# Patient Record
Sex: MALE | Race: WHITE | NOT HISPANIC OR LATINO
[De-identification: names, ages, dates, MRNs, and addresses within clinical notes are randomized per-mention and may not be internally consistent; named-entity substitution may affect disease eponyms.]

---

## 2017-12-12 ENCOUNTER — APPOINTMENT (OUTPATIENT)
Dept: ORTHOPEDIC SURGERY | Facility: CLINIC | Age: 52
End: 2017-12-12
Payer: COMMERCIAL

## 2017-12-12 VITALS
HEIGHT: 68 IN | BODY MASS INDEX: 25.76 KG/M2 | DIASTOLIC BLOOD PRESSURE: 70 MMHG | HEART RATE: 69 BPM | SYSTOLIC BLOOD PRESSURE: 109 MMHG | WEIGHT: 170 LBS

## 2017-12-12 PROCEDURE — 73030 X-RAY EXAM OF SHOULDER: CPT | Mod: LT

## 2017-12-12 PROCEDURE — 99214 OFFICE O/P EST MOD 30 MIN: CPT

## 2018-02-22 ENCOUNTER — APPOINTMENT (OUTPATIENT)
Dept: ORTHOPEDIC SURGERY | Facility: CLINIC | Age: 53
End: 2018-02-22
Payer: COMMERCIAL

## 2018-02-22 VITALS
BODY MASS INDEX: 25.76 KG/M2 | SYSTOLIC BLOOD PRESSURE: 118 MMHG | HEIGHT: 68 IN | WEIGHT: 170 LBS | HEART RATE: 78 BPM | DIASTOLIC BLOOD PRESSURE: 76 MMHG

## 2018-02-22 DIAGNOSIS — M25.562 PAIN IN LEFT KNEE: ICD-10-CM

## 2018-02-22 PROCEDURE — 99214 OFFICE O/P EST MOD 30 MIN: CPT

## 2018-02-22 PROCEDURE — 73564 X-RAY EXAM KNEE 4 OR MORE: CPT | Mod: LT

## 2018-02-26 ENCOUNTER — FORM ENCOUNTER (OUTPATIENT)
Age: 53
End: 2018-02-26

## 2018-02-27 ENCOUNTER — OUTPATIENT (OUTPATIENT)
Dept: OUTPATIENT SERVICES | Facility: HOSPITAL | Age: 53
LOS: 1 days | End: 2018-02-27
Payer: COMMERCIAL

## 2018-02-27 ENCOUNTER — APPOINTMENT (OUTPATIENT)
Dept: MRI IMAGING | Facility: CLINIC | Age: 53
End: 2018-02-27

## 2018-02-27 DIAGNOSIS — Z98.89 OTHER SPECIFIED POSTPROCEDURAL STATES: Chronic | ICD-10-CM

## 2018-02-27 DIAGNOSIS — M25.562 PAIN IN LEFT KNEE: ICD-10-CM

## 2018-02-27 DIAGNOSIS — M75.82 OTHER SHOULDER LESIONS, LEFT SHOULDER: ICD-10-CM

## 2018-02-27 PROCEDURE — 73221 MRI JOINT UPR EXTREM W/O DYE: CPT

## 2018-02-27 PROCEDURE — 73721 MRI JNT OF LWR EXTRE W/O DYE: CPT

## 2018-02-27 PROCEDURE — 73221 MRI JOINT UPR EXTREM W/O DYE: CPT | Mod: 26,LT

## 2018-02-27 PROCEDURE — 73721 MRI JNT OF LWR EXTRE W/O DYE: CPT | Mod: 26,LT

## 2018-03-26 ENCOUNTER — APPOINTMENT (OUTPATIENT)
Dept: ORTHOPEDIC SURGERY | Facility: CLINIC | Age: 53
End: 2018-03-26
Payer: COMMERCIAL

## 2018-03-26 VITALS
DIASTOLIC BLOOD PRESSURE: 75 MMHG | BODY MASS INDEX: 25.76 KG/M2 | WEIGHT: 170 LBS | HEIGHT: 68 IN | HEART RATE: 71 BPM | SYSTOLIC BLOOD PRESSURE: 124 MMHG

## 2018-03-26 DIAGNOSIS — M75.82 OTHER SHOULDER LESIONS, LEFT SHOULDER: ICD-10-CM

## 2018-03-26 DIAGNOSIS — S83.242D OTHER TEAR OF MEDIAL MENISCUS, CURRENT INJURY, LEFT KNEE, SUBSEQUENT ENCOUNTER: ICD-10-CM

## 2018-03-26 PROCEDURE — 99213 OFFICE O/P EST LOW 20 MIN: CPT

## 2019-08-30 ENCOUNTER — EMERGENCY (EMERGENCY)
Facility: HOSPITAL | Age: 54
LOS: 1 days | Discharge: HOME | End: 2019-08-30
Attending: EMERGENCY MEDICINE
Payer: COMMERCIAL

## 2019-08-30 VITALS
TEMPERATURE: 98 F | RESPIRATION RATE: 17 BRPM | DIASTOLIC BLOOD PRESSURE: 85 MMHG | SYSTOLIC BLOOD PRESSURE: 151 MMHG | HEART RATE: 64 BPM | OXYGEN SATURATION: 99 %

## 2019-08-30 VITALS
SYSTOLIC BLOOD PRESSURE: 150 MMHG | HEART RATE: 71 BPM | OXYGEN SATURATION: 99 % | DIASTOLIC BLOOD PRESSURE: 90 MMHG | WEIGHT: 164.91 LBS | RESPIRATION RATE: 18 BRPM | TEMPERATURE: 98 F | HEIGHT: 68 IN

## 2019-08-30 DIAGNOSIS — Z98.89 OTHER SPECIFIED POSTPROCEDURAL STATES: Chronic | ICD-10-CM

## 2019-08-30 PROCEDURE — 96375 TX/PRO/DX INJ NEW DRUG ADDON: CPT

## 2019-08-30 PROCEDURE — 99284 EMERGENCY DEPT VISIT MOD MDM: CPT | Mod: 25

## 2019-08-30 PROCEDURE — 99284 EMERGENCY DEPT VISIT MOD MDM: CPT

## 2019-08-30 PROCEDURE — 96374 THER/PROPH/DIAG INJ IV PUSH: CPT

## 2019-08-30 RX ORDER — SODIUM CHLORIDE 9 MG/ML
1000 INJECTION, SOLUTION INTRAVENOUS ONCE
Refills: 0 | Status: COMPLETED | OUTPATIENT
Start: 2019-08-30 | End: 2019-08-30

## 2019-08-30 RX ORDER — KETOROLAC TROMETHAMINE 30 MG/ML
15 SYRINGE (ML) INJECTION ONCE
Refills: 0 | Status: DISCONTINUED | OUTPATIENT
Start: 2019-08-30 | End: 2019-08-30

## 2019-08-30 RX ORDER — ACETAMINOPHEN 500 MG
650 TABLET ORAL ONCE
Refills: 0 | Status: COMPLETED | OUTPATIENT
Start: 2019-08-30 | End: 2019-08-30

## 2019-08-30 RX ORDER — METOCLOPRAMIDE HCL 10 MG
10 TABLET ORAL ONCE
Refills: 0 | Status: COMPLETED | OUTPATIENT
Start: 2019-08-30 | End: 2019-08-30

## 2019-08-30 RX ADMIN — Medication 650 MILLIGRAM(S): at 21:09

## 2019-08-30 RX ADMIN — Medication 15 MILLIGRAM(S): at 21:39

## 2019-08-30 RX ADMIN — Medication 10 MILLIGRAM(S): at 21:09

## 2019-08-30 RX ADMIN — SODIUM CHLORIDE 1000 MILLILITER(S): 9 INJECTION, SOLUTION INTRAVENOUS at 21:10

## 2019-08-30 RX ADMIN — Medication 15 MILLIGRAM(S): at 21:09

## 2019-08-30 RX ADMIN — Medication 650 MILLIGRAM(S): at 21:39

## 2019-08-30 NOTE — ED PROVIDER NOTE - OBJECTIVE STATEMENT
54m w hx anxiety here c/o headache x1 wk. Pt localizes pain to posterior head on left side w/o radiation. Pain started while doing work in yard. Gradual onset, constant since started. Has had headaches before but usually bifrontal. No neck pain/stiffness. No fever. No visual changes or vomiting. H/a not worse any one time of the day. Has tried tylenol and motrin w some relief.

## 2019-08-30 NOTE — ED PROVIDER NOTE - PROGRESS NOTE DETAILS
Elizabeth Goldberger PGY-3: pt feeling better after meds. Appears well. Will dc recommending outpt neuro f/u

## 2019-08-30 NOTE — ED ADULT NURSE NOTE - CAS DISCH CONDITION
Assumed care of patient from Karan Reyes PA-C at shift change 1800. CT results pending. LFT elevation. UTI present. 2009: CT negative. Referred to GI for LFTs. Treated UTI. Discussed treatment plan, return precautions, symptomatic relief, and expected time to improvement. All questions answered. Patient is stable for discharge and outpatient management.       Parish Dennis PA-C Stable

## 2019-08-30 NOTE — ED PROVIDER NOTE - CLINICAL SUMMARY MEDICAL DECISION MAKING FREE TEXT BOX
54m w hx anxiety here c/o 1 wk left-sided posterior headache. No red flags, nl neuro exam. Will treat sx and r/a

## 2019-08-30 NOTE — ED ADULT NURSE NOTE - OBJECTIVE STATEMENT
54 y.o M A&Ox3, with PMH of anxiety, presents to the ED c/o HA x1 week. Pt. reports pain to occipital portion of head - worse when palpated. Reports discomfort traveling up back of head associated with numbness sensation. Rates pain level 10/10 at this time. No relief over last week. Gross neuro intact. Pos. strength to all extremities. Denies vision changes, fevers, chills, SOB, CP, dizziness, n/v/d, ABD pain at this time. PERRL. Denies any recent falls or traumas. Safety and comfort provided.

## 2019-08-30 NOTE — ED PROVIDER NOTE - NSFOLLOWUPINSTRUCTIONS_ED_ALL_ED_FT
You were seen in the emergency department for headache. Please follow up with a neurologist in the next 3-5 days. Take motrin 400 milligrams every 4 hours as needed for continued headache. Return to the emergency department immediately if you experience fever, neck stiffness, changes in vision, or any other concerning symptoms.

## 2019-08-30 NOTE — ED PROVIDER NOTE - ATTENDING CONTRIBUTION TO CARE
55 yo male p/w HA x 1 week.  gradual onset, not maximal at onset, no LOC, no neck pain/stiffness, no fever.  Very well appearing, smiling, conversant.  Conservative treatment and reassess.

## 2019-08-30 NOTE — ED PROVIDER NOTE - PATIENT PORTAL LINK FT
You can access the FollowMyHealth Patient Portal offered by [facility] by registering at the following website: [email or web address]. By joining [facility]’s FollowMyHealth portal, you will also be able to view your health information using other applications (apps) compatible with our system.

## 2019-08-30 NOTE — ED PROVIDER NOTE - NSFOLLOWUPCLINICS_GEN_ALL_ED_FT
[facility]  Neurology  [address] [place] [address] [address] [place] [address]  Phone: [phone]  Fax:   Follow Up Time:

## 2019-08-30 NOTE — ED ADULT NURSE NOTE - NSIMPLEMENTINTERV_GEN_ALL_ED
Implemented All Universal Safety Interventions:  [place] to call system. Call bell, personal items and telephone within reach. Instruct patient to call for assistance. Room bathroom lighting operational. Non-slip footwear when patient is off stretcher. Physically safe environment: no spills, clutter or unnecessary equipment. Stretcher in lowest position, wheels locked, appropriate side rails in place.

## 2019-09-17 ENCOUNTER — APPOINTMENT (OUTPATIENT)
Dept: GASTROENTEROLOGY | Facility: CLINIC | Age: 54
End: 2019-09-17

## 2020-09-11 ENCOUNTER — NON-APPOINTMENT (OUTPATIENT)
Age: 55
End: 2020-09-11

## 2020-09-11 ENCOUNTER — APPOINTMENT (OUTPATIENT)
Dept: OPHTHALMOLOGY | Facility: CLINIC | Age: 55
End: 2020-09-11
Payer: COMMERCIAL

## 2020-09-11 PROCEDURE — 92014 COMPRE OPH EXAM EST PT 1/>: CPT

## 2022-04-22 ENCOUNTER — TRANSCRIPTION ENCOUNTER (OUTPATIENT)
Age: 57
End: 2022-04-22

## 2022-06-11 ENCOUNTER — NON-APPOINTMENT (OUTPATIENT)
Age: 57
End: 2022-06-11

## 2022-06-22 ENCOUNTER — NON-APPOINTMENT (OUTPATIENT)
Age: 57
End: 2022-06-22

## 2022-06-23 ENCOUNTER — EMERGENCY (EMERGENCY)
Facility: HOSPITAL | Age: 57
LOS: 1 days | Discharge: HOME | End: 2022-06-23
Attending: STUDENT IN AN ORGANIZED HEALTH CARE EDUCATION/TRAINING PROGRAM
Payer: COMMERCIAL

## 2022-06-23 VITALS
HEART RATE: 65 BPM | DIASTOLIC BLOOD PRESSURE: 90 MMHG | OXYGEN SATURATION: 99 % | RESPIRATION RATE: 18 BRPM | SYSTOLIC BLOOD PRESSURE: 150 MMHG

## 2022-06-23 VITALS
TEMPERATURE: 98 F | OXYGEN SATURATION: 99 % | RESPIRATION RATE: 20 BRPM | HEART RATE: 68 BPM | HEIGHT: 68 IN | SYSTOLIC BLOOD PRESSURE: 139 MMHG | WEIGHT: 175.05 LBS | DIASTOLIC BLOOD PRESSURE: 84 MMHG

## 2022-06-23 DIAGNOSIS — Z98.89 OTHER SPECIFIED POSTPROCEDURAL STATES: Chronic | ICD-10-CM

## 2022-06-23 PROCEDURE — 73562 X-RAY EXAM OF KNEE 3: CPT | Mod: 26,RT

## 2022-06-23 PROCEDURE — 73120 X-RAY EXAM OF HAND: CPT | Mod: 26,RT

## 2022-06-23 PROCEDURE — 73070 X-RAY EXAM OF ELBOW: CPT

## 2022-06-23 PROCEDURE — 99284 EMERGENCY DEPT VISIT MOD MDM: CPT

## 2022-06-23 PROCEDURE — 73562 X-RAY EXAM OF KNEE 3: CPT

## 2022-06-23 PROCEDURE — 99284 EMERGENCY DEPT VISIT MOD MDM: CPT | Mod: 25

## 2022-06-23 PROCEDURE — 73120 X-RAY EXAM OF HAND: CPT

## 2022-06-23 PROCEDURE — 73070 X-RAY EXAM OF ELBOW: CPT | Mod: 26,RT

## 2022-06-23 RX ORDER — AZTREONAM 2 G
1 VIAL (EA) INJECTION
Qty: 14 | Refills: 0
Start: 2022-06-23 | End: 2022-06-29

## 2022-06-23 RX ORDER — CEPHALEXIN 500 MG
500 CAPSULE ORAL ONCE
Refills: 0 | Status: COMPLETED | OUTPATIENT
Start: 2022-06-23 | End: 2022-06-23

## 2022-06-23 RX ORDER — CEPHALEXIN 500 MG
1 CAPSULE ORAL
Qty: 28 | Refills: 0
Start: 2022-06-23 | End: 2022-06-29

## 2022-06-23 RX ADMIN — Medication 500 MILLIGRAM(S): at 20:23

## 2022-06-23 RX ADMIN — Medication 1 TABLET(S): at 20:17

## 2022-06-23 NOTE — ED ADULT NURSE NOTE - OBJECTIVE STATEMENT
Pt had a mechanical trip and fall 11 days ago during which he sustained abrasions to his right elbow, dorsum of right hand and patellar region of right knee.  All areas have scabbed over and each is surrounded by mild erythema.  He denies fevers or chills.  No drainage present.

## 2022-06-23 NOTE — ED PROVIDER NOTE - PROGRESS NOTE DETAILS
discussion w/ pt regarding CDU for wound care and monitoring pt prefers to go home and will trial a course of oral antibiotics, pt to return to the ER for wound check in 48-72 hours to ensure wounds are responding to antibiotics

## 2022-06-23 NOTE — ED PROVIDER NOTE - NSFOLLOWUPINSTRUCTIONS_ED_ALL_ED_FT
1.  Stay well hydrated  2.  Take Keflex 500mgs every 6 hrs x 7 days and Bactrim DS 1 tab every 12 hrs x 7 days  3.  Follow up with your PMD in 2-3 days.   4.  Follow up with wound care clinic upon discharge.  Call to scheduled an appointment  5.  Return to the ER for worsening redness, fevers, worsening pain or any other concerning symptoms 1.  Stay well hydrated  2.  Take Keflex 500mgs every 6 hrs x 7 days and Bactrim DS 1 tab every 12 hrs x 7 days  3.  Follow up with your PMD in 2-3 days.   4.  Follow up with wound care clinic upon discharge.  Call to scheduled an appointment  5.  Return to the ER for worsening redness, fevers, worsening pain or any other concerning symptoms  6. please return to the ER in 48-72 hours for wound recheck and suture removal.

## 2022-06-23 NOTE — ED ADULT NURSE NOTE - NSIMPLEMENTINTERV_GEN_ALL_ED
Implemented All Fall Risk Interventions:  [place] to call system. Call bell, personal items and telephone within reach. Instruct patient to call for assistance. Room bathroom lighting operational. Non-slip footwear when patient is off stretcher. Physically safe environment: no spills, clutter or unnecessary equipment. Stretcher in lowest position, wheels locked, appropriate side rails in place. Provide visual cue, wrist band, yellow gown, etc. Monitor gait and stability. Monitor for mental status changes and reorient to person, place, and time. Review medications for side effects contributing to fall risk. Reinforce activity limits and safety measures with patient and family.

## 2022-06-23 NOTE — ED PROVIDER NOTE - OBJECTIVE STATEMENT
56 yo male with no PMHx presenting for wound check.  Patient reports that he tripped and fell while walking outside 11 days ago.  At that time patient suffered lacerations/abrasions to his right knee, right elbow and the dorsum of the right hand.  Patient was seen at urgent care at that time and had a few sutures placed in the right elbow.  Not started on abx.  Was instructed to return to urgent care in 10 days for suture removal and wound check.  Patient went to urgent care today and was subsequent sent to the ER due to concern for infection.  Patient denies fevers/chills, spreading erythema, worsening pain, purulent discharge, Cp, SOB, abd pain, NVD.

## 2022-06-23 NOTE — ED PROVIDER NOTE - NSFOLLOWUPCLINICS_GEN_ALL_ED_FT
[facility]  Podiatry/Wound Care  [address] [place] [address]  Phone: [phone]  Fax: [phone]    [facility]  Wound Care  [address] [place] [address]  Phone: [phone]  Fax: [phone]    [facility]  Wound Care  [address] [place] [address]  Phone: [phone]  Fax:

## 2022-06-23 NOTE — ED ADULT TRIAGE NOTE - CHIEF COMPLAINT QUOTE
Sent by  for right elbow, right hand, right knee wound infections s/p fall with laceration x 11 days ago

## 2022-06-23 NOTE — ED ADULT NURSE NOTE - NSICDXPASTMEDICALHX_GEN_ALL_CORE_FT
PAST MEDICAL HISTORY:  Anxiety     Tear of meniscus of left knee left knee    Tear of meniscus, lateral right knee

## 2022-06-23 NOTE — ED PROVIDER NOTE - ATTENDING APP SHARED VISIT CONTRIBUTION OF CARE
57 M here w/ wound check, pt was seen at  11 days ago had 3 stitches to the R elbow, and reports that he was told to come to the  for suture removal and wound check was seen in  and referred to the ER for eval. pt w/ no fevers, no chills, no nausea no vomiting, no purulent discharge. no worsening redness. On exam, pt is awake and alert in no distress, he has R dorsum along 5th metacarpal 1 cm scab w/ no purulent drainage, the wrist on the ulnar aspect 2cm scab w/ no purulent drainage, minimal surrounding erythema and the R elbow w/ a 3 cm scab w/ mild surrounding erythema and no purulen drainage but serous drainage, pt w/ a 4 cm scab on the patella on the R knee w/ minimal surrounding erythema and no purulent drainage. pt w/ intact flexion and extension of the R knee and the R wrist, fingers elbow. no streaking of erythema. Plan for antibiotics, keflex and bactrim, to return to the er in 3 days for wound check and suture removal.

## 2022-06-23 NOTE — ED PROVIDER NOTE - PHYSICAL EXAMINATION
Gen: AAO x 3, NAD  Skin: healing abrasion to the ulnar dorsal aspect of right hand.  large central scab with minimal surrounding erythema, no tenderness or discharge.  Healing abrasion overlying right elbow with central scab and mild surrounding erythema.  Small amount of purulent discharge expressed from wound.  FROM of the elbow.  Mild TTP.  Well healing scab over right knee mild surrounding erythema, no purulence or fluctuation.  FROM of the knee  HEENT: NC/AT, PERRLA, EOMI, MMM  Resp: unlabored CTAB  Cardiac: rrr s1s2, no murmurs, rubs or gallops  GI: ND, +BS, Soft, NT  Ext: no pedal edema, FROM in all extremities  Neuro: no focal deficits

## 2022-06-27 ENCOUNTER — EMERGENCY (EMERGENCY)
Facility: HOSPITAL | Age: 57
LOS: 1 days | Discharge: HOME | End: 2022-06-27
Attending: STUDENT IN AN ORGANIZED HEALTH CARE EDUCATION/TRAINING PROGRAM
Payer: COMMERCIAL

## 2022-06-27 VITALS
HEIGHT: 68 IN | DIASTOLIC BLOOD PRESSURE: 82 MMHG | HEART RATE: 69 BPM | OXYGEN SATURATION: 97 % | TEMPERATURE: 98 F | WEIGHT: 169.98 LBS | RESPIRATION RATE: 20 BRPM | SYSTOLIC BLOOD PRESSURE: 134 MMHG

## 2022-06-27 DIAGNOSIS — Z98.89 OTHER SPECIFIED POSTPROCEDURAL STATES: Chronic | ICD-10-CM

## 2022-06-27 PROCEDURE — 99281 EMR DPT VST MAYX REQ PHY/QHP: CPT

## 2022-06-27 NOTE — ED PROCEDURE NOTE - CPROC ED POST PROC CARE GUIDE1
vomiting, weak and dizzy since this am Verbal/written post procedure instructions were given to patient/caregiver.

## 2022-06-27 NOTE — ED PROVIDER NOTE - PATIENT PORTAL LINK FT
You can access the FollowMyHealth Patient Portal offered by [facility] by registering at the following website: [email or web address]. By joining [employer]’s FollowMyHealth portal, you will also be able to view your health information using other applications (apps) compatible with our system.

## 2022-06-27 NOTE — ED PROVIDER NOTE - PHYSICAL EXAMINATION
Gen: Well appearing and in NAD  Head: normal appearing atraumatic   Neck: trachea midline  Resp:  No respiratory distress  Abd; soft NT ND  Ext: no visible deformities, right elbow c/d/i with well healing scab on posterior surface no fluctuance, no surrounding erythema, no crepitus and no drainage 3 sutures removed with ease. right wrist abrasion and right knee abrasion c/d/i well healing FROM no fluctuance, no surrounding erythema, no crepitus and no drainage   Neuro:  Alert and oriented, appears non focal  Skin:  Warm and dry as visualized  Psych:  Normal affect and mood  ~John Herring D.O, -ED Attending

## 2022-06-27 NOTE — ED PROVIDER NOTE - NS ED ROS FT

## 2022-06-27 NOTE — ED PROVIDER NOTE - NSFOLLOWUPINSTRUCTIONS_ED_ALL_ED_FT
1. TAKE ALL MEDICATIONS AS DIRECTED.    2. FOR PAIN OR FEVER YOU CAN TAKE IBUPROFEN (MOTRIN, ADVIL) OR ACETAMINOPHEN (TYLENOL) AS NEEDED, AS DIRECTED ON PACKAGING.  3. FOLLOW UP WITH YOUR PRIMARY DOCTOR WITHIN 5 DAYS AS DIRECTED.  4. IF YOU HAD LABS OR IMAGING DONE, YOU WERE GIVEN COPIES OF ALL LABS AND/OR IMAGING RESULTS FROM YOUR ER VISIT--PLEASE TAKE THEM WITH YOU TO YOUR FOLLOW UP APPOINTMENTS.  5. RETURN TO THE ER FOR ANY WORSENING SYMPTOMS OR CONCERNS.      Wound Care, Adult      Taking care of your wound properly can help to prevent pain, infection, and scarring. It can also help your wound heal more quickly. Follow instructions from your health care provider about how to care for your wound.      Supplies needed:    •Soap and water.      •Wound cleanser.      •Gauze.      •If needed, a clean bandage (dressing) or other type of wound dressing material to cover or place in the wound. Follow your health care provider's instructions about what dressing supplies to use.      •Cream or ointment to apply to the wound, if told by your health care provider.        How to care for your wound    Cleaning the wound     Ask your health care provider how to clean the wound. This may include:  •Using mild soap and water or a wound cleanser.      •Using a clean gauze to pat the wound dry after cleaning it. Do not rub or scrub the wound.      Dressing care    •Wash your hands with soap and water for at least 20 seconds before and after you change the dressing. If soap and water are not available, use hand .    •Change your dressing as told by your health care provider. This may include:  •Cleaning or rinsing out (irrigating) the wound.      •Placing a dressing over the wound or in the wound (packing).      •Covering the wound with an outer dressing.        •Leave any stitches (sutures), skin glue, or adhesive strips in place. These skin closures may need to stay in place for 2 weeks or longer. If adhesive strip edges start to loosen and curl up, you may trim the loose edges. Do not remove adhesive strips completely unless your health care provider tells you to do that.      •Ask your health care provider when you can leave the wound uncovered.        Checking for infection    Check your wound area every day for signs of infection. Check for:  •More redness, swelling, or pain.      •Fluid or blood.      •Warmth.      •Pus or a bad smell.        Follow these instructions at home    Medicines     •If you were prescribed an antibiotic medicine, cream, or ointment, take or apply it as told by your health care provider. Do not stop using the antibiotic even if your condition improves.      •If you were prescribed pain medicine, take it 30 minutes before you do any wound care or as told by your health care provider.      •Take over-the-counter and prescription medicines only as told by your health care provider.      Eating and drinking   •Eat a diet that includes protein, vitamin A, vitamin C, and other nutrient-rich foods to help the wound heal.  •Foods rich in protein include meat, fish, eggs, dairy, beans, and nuts.      •Foods rich in vitamin A include carrots and dark green, leafy vegetables.      •Foods rich in vitamin C include citrus fruits, tomatoes, broccoli, and peppers.        •Drink enough fluid to keep your urine pale yellow.      General instructions     • Do not take baths, swim, use a hot tub, or do anything that would put the wound underwater until your health care provider approves. Ask your health care provider if you may take showers. You may only be allowed to take sponge baths.      • Do not scratch or pick at the wound. Keep it covered as told by your health care provider.      •Return to your normal activities as told by your health care provider. Ask your health care provider what activities are safe for you.      •Protect your wound from the sun when you are outside for the first 6 months, or for as long as told by your health care provider. Cover up the scar area or apply sunscreen that has an SPF of at least 30.      • Do not use any products that contain nicotine or tobacco, such as cigarettes, e-cigarettes, and chewing tobacco. These may delay wound healing. If you need help quitting, ask your health care provider.      •Keep all follow-up visits as told by your health care provider. This is important.        Contact a health care provider if:    •You received a tetanus shot and you have swelling, severe pain, redness, or bleeding at the injection site.      •Your pain is not controlled with medicine.    •You have any of these signs of infection:  •More redness, swelling, or pain around the wound.      •Fluid or blood coming from the wound.      •Warmth coming from the wound.      •Pus or a bad smell coming from the wound.      •A fever or chills.        •You are nauseous or you vomit.      •You are dizzy.        Get help right away if:    •You have a red streak of skin near the area around your wound.      •Your wound has been closed with staples, sutures, skin glue, or adhesive strips and it begins to open up and separate.      •Your wound is bleeding, and the bleeding does not stop with gentle pressure.      •You have a rash.      •You faint.      •You have trouble breathing.      These symptoms may represent a serious problem that is an emergency. Do not wait to see if the symptoms will go away. Get medical help right away. Call your local emergency services (911 in the [place] Do not drive yourself to the hospital.       Summary    •Always wash your hands with soap and water for at least 20 seconds before and after changing your dressing.      •Change your dressing as told by your health care provider.      •To help with healing, eat foods that are rich in protein, vitamin A, vitamin C, and other nutrients.      •Check your wound every day for signs of infection. Contact your health care provider if you suspect that your wound is infected.      This information is not intended to replace advice given to you by your health care provider. Make sure you discuss any questions you have with your health care provider.

## 2022-06-27 NOTE — ED PROVIDER NOTE - CLINICAL SUMMARY MEDICAL DECISION MAKING FREE TEXT BOX
58 yo male with no PMHx presenting for suture removal. all wound seem non infected well healing. 3 sutures to right elbow removed with ease. dc with Follow up return precautions clean dressings applied to wounds

## 2022-06-27 NOTE — ED PROVIDER NOTE - OBJECTIVE STATEMENT
56 yo male with no PMHx presenting for wound check.  Patient reports that he tripped and fell while walking outside 15 days ago.  At that time patient suffered lacerations/abrasions to his right knee, right elbow and the dorsum of the right hand.  Patient was seen at urgent care at that time and had 3 sutures placed in the right elbow.  pw wound checks and suture removal of elbow sutures. Denies recent trauma, fevers, chills, headache, dizziness, nausea, vomiting, dysuria, freq, hematuria, diarrhea, constipation, chest pain, shortness of breath, cough.

## 2023-10-04 ENCOUNTER — NON-APPOINTMENT (OUTPATIENT)
Age: 58
End: 2023-10-04

## 2023-10-17 ENCOUNTER — NON-APPOINTMENT (OUTPATIENT)
Age: 58
End: 2023-10-17

## 2024-09-15 NOTE — ED PROVIDER NOTE - NS ED ATTENDING STATEMENT MOD
Self
This was a shared visit with the ARIEL. I reviewed and verified the documentation and independently performed the documented: